# Patient Record
Sex: FEMALE | Race: WHITE | NOT HISPANIC OR LATINO
[De-identification: names, ages, dates, MRNs, and addresses within clinical notes are randomized per-mention and may not be internally consistent; named-entity substitution may affect disease eponyms.]

---

## 2022-05-24 PROBLEM — Z00.00 ENCOUNTER FOR PREVENTIVE HEALTH EXAMINATION: Status: ACTIVE | Noted: 2022-05-24

## 2022-06-24 ENCOUNTER — APPOINTMENT (OUTPATIENT)
Dept: COLORECTAL SURGERY | Facility: CLINIC | Age: 67
End: 2022-06-24
Payer: COMMERCIAL

## 2022-06-24 ENCOUNTER — NON-APPOINTMENT (OUTPATIENT)
Age: 67
End: 2022-06-24

## 2022-06-24 VITALS
WEIGHT: 142 LBS | DIASTOLIC BLOOD PRESSURE: 85 MMHG | HEART RATE: 80 BPM | SYSTOLIC BLOOD PRESSURE: 145 MMHG | TEMPERATURE: 98.7 F | BODY MASS INDEX: 21.52 KG/M2 | HEIGHT: 68 IN

## 2022-06-24 VITALS — DIASTOLIC BLOOD PRESSURE: 81 MMHG | TEMPERATURE: 73 F | SYSTOLIC BLOOD PRESSURE: 129 MMHG

## 2022-06-24 DIAGNOSIS — A63.0 ANOGENITAL (VENEREAL) WARTS: ICD-10-CM

## 2022-06-24 DIAGNOSIS — Z80.42 FAMILY HISTORY OF MALIGNANT NEOPLASM OF PROSTATE: ICD-10-CM

## 2022-06-24 DIAGNOSIS — Z82.49 FAMILY HISTORY OF ISCHEMIC HEART DISEASE AND OTHER DISEASES OF THE CIRCULATORY SYSTEM: ICD-10-CM

## 2022-06-24 DIAGNOSIS — Z83.79 FAMILY HISTORY OF OTHER DISEASES OF THE DIGESTIVE SYSTEM: ICD-10-CM

## 2022-06-24 DIAGNOSIS — K64.9 UNSPECIFIED HEMORRHOIDS: ICD-10-CM

## 2022-06-24 DIAGNOSIS — Z86.010 PERSONAL HISTORY OF COLONIC POLYPS: ICD-10-CM

## 2022-06-24 DIAGNOSIS — Z86.018 PERSONAL HISTORY OF OTHER BENIGN NEOPLASM: ICD-10-CM

## 2022-06-24 DIAGNOSIS — Z78.9 OTHER SPECIFIED HEALTH STATUS: ICD-10-CM

## 2022-06-24 DIAGNOSIS — Z83.438 FAMILY HISTORY OF OTHER DISORDER OF LIPOPROTEIN METABOLISM AND OTHER LIPIDEMIA: ICD-10-CM

## 2022-06-24 PROCEDURE — 99202 OFFICE O/P NEW SF 15 MIN: CPT | Mod: 25

## 2022-06-24 PROCEDURE — 46600 DIAGNOSTIC ANOSCOPY SPX: CPT

## 2022-06-24 RX ORDER — UBIDECARENONE/VIT E ACET 100MG-5
CAPSULE ORAL
Refills: 0 | Status: ACTIVE | COMMUNITY

## 2022-06-24 RX ORDER — MULTIVIT-MIN/IRON/FOLIC ACID/K 18-600-40
CAPSULE ORAL
Refills: 0 | Status: ACTIVE | COMMUNITY

## 2022-06-24 NOTE — PHYSICAL EXAM
[Excoriation] : no perianal excoriation [Skin Tags] : residual hemorrhoidal skin tags were noted [Normal] : was normal [None] : there was no rectal mass  [de-identified] : Anal pap performed [FreeTextEntry1] : Medical assistant was present for the entire exam.\par \par Anoscopy was performed for evaluation of the patients rectal bleeding  history .\par The risks, benefits and alternatives were reviewed.\par \par A lighted anoscope was passed into the anal canal and the entire anal mucosal surface was inspected..  \par The findings revealed mild internal hemorrhoids.\par No masses or lesions were identified.\par \par

## 2022-06-24 NOTE — HISTORY OF PRESENT ILLNESS
[FreeTextEntry1] : 65 yo F presents for evaluation of anal dysplasia, referred by Dr. VASYL Panchal\par \par PMH\par PSH hysterectomy 11/28/2007 2/2 fibroids, hernia repair 7/20/2004\par \par Previously under the care of Dr. 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 Zan Quezada which was negative per patient. \par h/o colon polyps (sessile serrated on prior scope 2015 as well as condyloma)\par Next scheduled 10/2022\par \par

## 2022-06-27 ENCOUNTER — LABORATORY RESULT (OUTPATIENT)
Age: 67
End: 2022-06-27

## 2022-06-28 PROBLEM — Z83.438 FAMILY HISTORY OF HYPERLIPIDEMIA: Status: ACTIVE | Noted: 2022-06-24

## 2022-06-28 PROBLEM — Z82.49 FAMILY HISTORY OF HYPERTENSION: Status: ACTIVE | Noted: 2022-06-24

## 2022-06-28 PROBLEM — A63.0 HPV (HUMAN PAPILLOMA VIRUS) ANOGENITAL INFECTION: Status: RESOLVED | Noted: 2022-06-22 | Resolved: 2022-06-28

## 2022-06-28 PROBLEM — Z80.42 FAMILY HISTORY OF MALIGNANT NEOPLASM OF PROSTATE: Status: ACTIVE | Noted: 2022-06-22

## 2022-06-28 PROBLEM — K64.9 HEMORRHOIDS: Status: ACTIVE | Noted: 2022-06-22

## 2022-06-28 PROBLEM — Z83.79 FAMILY HISTORY OF COLONIC DIVERTICULITIS: Status: ACTIVE | Noted: 2022-06-22

## 2022-06-28 PROBLEM — Z86.010 HISTORY OF COLONIC POLYPS: Status: RESOLVED | Noted: 2022-06-24 | Resolved: 2022-06-28

## 2022-06-28 PROBLEM — Z78.9 NON-SMOKER: Status: ACTIVE | Noted: 2022-06-24

## 2022-06-28 PROBLEM — Z86.018 HISTORY OF UTERINE LEIOMYOMA: Status: RESOLVED | Noted: 2022-06-22 | Resolved: 2022-06-28

## 2022-06-30 LAB — ANAL PAP CYTOLOGY: NORMAL

## 2022-07-06 ENCOUNTER — TRANSCRIPTION ENCOUNTER (OUTPATIENT)
Age: 67
End: 2022-07-06

## 2022-12-12 ENCOUNTER — APPOINTMENT (OUTPATIENT)
Dept: COLORECTAL SURGERY | Facility: CLINIC | Age: 67
End: 2022-12-12

## 2022-12-12 ENCOUNTER — LABORATORY RESULT (OUTPATIENT)
Age: 67
End: 2022-12-12

## 2022-12-12 VITALS
HEIGHT: 68 IN | SYSTOLIC BLOOD PRESSURE: 157 MMHG | TEMPERATURE: 97 F | WEIGHT: 140 LBS | HEART RATE: 73 BPM | BODY MASS INDEX: 21.22 KG/M2 | DIASTOLIC BLOOD PRESSURE: 84 MMHG

## 2022-12-12 PROCEDURE — 46600 DIAGNOSTIC ANOSCOPY SPX: CPT

## 2022-12-12 PROCEDURE — 99213 OFFICE O/P EST LOW 20 MIN: CPT | Mod: 25

## 2022-12-12 NOTE — PHYSICAL EXAM
[Excoriation] : no perianal excoriation [Skin Tags] : residual hemorrhoidal skin tags were noted [Normal] : was normal [None] : there was no rectal mass  [de-identified] : Anal pap performed [FreeTextEntry1] : Medical assistant was present for the entire exam.\par \par Anoscopy was performed for evaluation of the patients rectal bleeding  history .\par The risks, benefits and alternatives were reviewed.\par \par A lighted anoscope was passed into the anal canal and the entire anal mucosal surface was inspected..  \par The findings revealed mild internal hemorrhoids.\par No masses or lesions were identified.\par \par

## 2022-12-12 NOTE — HISTORY OF PRESENT ILLNESS
[FreeTextEntry1] : 68 y/o F presents for follow up evaluation of anal dysplasia \par Patient known to Dr. Panchal\par PSH hysterectomy 11/28/2007 2/2 fibroids, hernia repair 7/20/2004\par \par Previously under the care of Dr. 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 No perianal excoriation, external hemorrhoid, residual hemorrhoidal skin tags noted. Sphincter tone was normal. no rectal tenderness. Mild internal hemorrhoids appreciated on anoscopy. Routine evaluation, and anal pap surveillance advised. \par Anal pap (6/29/22) Atypical squamous cells of undetermined significance (ASC-US), HR HPV DNA present. 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 Denies pain, itching, lumps or bumps or bleeding\par No issues w/ BMs\par \par \par Received Gardasil Vaccine (2018)\par Last colonoscopy end Nov w/ Dr. Quezada, 1 benign polyp, recommend to repeat in 5-7 years\par Prior Colonoscopy 10/20/19,w/ Dr. Zan Quezada which was negative per patient. \par h/o colon polyps (sessile serrated on prior scope 2015 as well as condyloma)\par \par Took Motrin in the last week.

## 2022-12-21 ENCOUNTER — NON-APPOINTMENT (OUTPATIENT)
Age: 67
End: 2022-12-21

## 2022-12-21 LAB — ANAL PAP CYTOLOGY: NORMAL

## 2023-07-21 ENCOUNTER — APPOINTMENT (OUTPATIENT)
Dept: COLORECTAL SURGERY | Facility: CLINIC | Age: 68
End: 2023-07-21
Payer: MEDICARE

## 2023-07-21 ENCOUNTER — NON-APPOINTMENT (OUTPATIENT)
Age: 68
End: 2023-07-21

## 2023-07-21 VITALS
SYSTOLIC BLOOD PRESSURE: 139 MMHG | WEIGHT: 144 LBS | DIASTOLIC BLOOD PRESSURE: 85 MMHG | HEIGHT: 68 IN | HEART RATE: 70 BPM | BODY MASS INDEX: 21.82 KG/M2 | TEMPERATURE: 98.2 F

## 2023-07-21 PROCEDURE — 46600 DIAGNOSTIC ANOSCOPY SPX: CPT

## 2023-07-21 PROCEDURE — 99213 OFFICE O/P EST LOW 20 MIN: CPT | Mod: 25

## 2023-07-21 NOTE — PHYSICAL EXAM
[Excoriation] : no perianal excoriation [Skin Tags] : residual hemorrhoidal skin tags were noted [Normal] : was normal [None] : there was no rectal mass  [de-identified] : Anal pap performed [FreeTextEntry1] : Medical assistant was present for the entire exam.\par \par Anoscopy was performed for evaluation of the patients rectal bleeding  history .\par The risks, benefits and alternatives were reviewed.\par \par A lighted anoscope was passed into the anal canal and the entire anal mucosal surface was inspected..  \par The findings revealed mild internal hemorrhoids.\par No masses or lesions were identified.\par \par

## 2023-07-21 NOTE — HISTORY OF PRESENT ILLNESS
[FreeTextEntry1] : 66 y/o F presents in follow up of anal dysplasia \par Patient known to Dr. Panchal\par PSH hysterectomy 11/28/2007 2/2 fibroids, hernia repair 7/20/2004\par \par Received Gardasil Vaccine (2018)\par Last colonoscopy end Nov w/ Dr. Quezada, 1 benign polyp, recommend to repeat in 5-7 years\par Prior Colonoscopy 10/20/19,w/ Dr. Zan Quezada which was negative per patient. \par h/o colon polyps (sessile serrated on prior scope 2015 as well as condyloma)\par \par Previously under the care of Dr. 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 No perianal excoriation, external hemorrhoid, residual hemorrhoidal skin tags noted. Sphincter tone was normal. no rectal tenderness. Mild internal hemorrhoids appreciated on anoscopy. Routine evaluation, and anal pap surveillance advised. \par Anal pap (6/29/22) Atypical squamous cells of undetermined significance (ASC-US), HR HPV DNA present. 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 External hemorrhoid, residual hemorrhoidal skin tags noted. Sphincter tone was normal. Mild internal hemorrhoids. \par Anal pap: Atypical squamous cells of undetermined significance (ASC-US)\par \par Pt otherwise doing well. Reports change in stool consistency (looser) while in [place] while taking care of her parents, then also in while traveling in [facility] that caused some raw feeling. Last occurred a couple days ago. She did not apply any topical creams\par Denies pain or bleeding\par Stool consistency has normalized since returning back to [place]. Follows vegetarian diet and reports adequate fiber intake\par \par

## 2023-08-02 ENCOUNTER — NON-APPOINTMENT (OUTPATIENT)
Age: 68
End: 2023-08-02

## 2023-08-02 LAB — ANAL PAP CYTOLOGY: NORMAL

## 2023-11-17 ENCOUNTER — APPOINTMENT (OUTPATIENT)
Dept: COLORECTAL SURGERY | Facility: CLINIC | Age: 68
End: 2023-11-17
Payer: MEDICARE

## 2023-11-17 VITALS
DIASTOLIC BLOOD PRESSURE: 82 MMHG | WEIGHT: 138 LBS | HEIGHT: 68 IN | SYSTOLIC BLOOD PRESSURE: 139 MMHG | HEART RATE: 67 BPM | TEMPERATURE: 97.9 F | BODY MASS INDEX: 20.92 KG/M2

## 2023-11-17 PROCEDURE — 46600 DIAGNOSTIC ANOSCOPY SPX: CPT

## 2023-11-17 PROCEDURE — 99214 OFFICE O/P EST MOD 30 MIN: CPT | Mod: 25

## 2023-11-22 LAB — ANAL PAP CYTOLOGY: NORMAL

## 2024-01-17 ENCOUNTER — TRANSCRIPTION ENCOUNTER (OUTPATIENT)
Age: 69
End: 2024-01-17

## 2024-01-17 NOTE — ASU PATIENT PROFILE, ADULT - FALL HARM RISK - HARM RISK INTERVENTIONS

## 2024-01-17 NOTE — ASU PATIENT PROFILE, ADULT - NSICDXPASTSURGICALHX_GEN_ALL_CORE_FT
PAST SURGICAL HISTORY:  H/O inguinal hernia repair right    S/P colonoscopy X4    S/P hysterectomy 2006

## 2024-01-17 NOTE — ASU PATIENT PROFILE, ADULT - NSICDXPASTMEDICALHX_GEN_ALL_CORE_FT
PAST MEDICAL HISTORY:  Asthma     H/O chronic sinusitis     History of Clostridium difficile colitis     Hypertrophy, nasal, turbinate     MVP (mitral valve prolapse)      PAST MEDICAL HISTORY:  H/O migraine      PAST MEDICAL HISTORY:  H/O migraine     Hyperparathyroidism

## 2024-01-17 NOTE — ASU PATIENT PROFILE, ADULT - NS PREOP UNDERSTANDS INFO
As per MD nothing to eat or drink after midnight; patient reminded to come with photo ID/insurance/credit card; dress comfortable; no jewelries/contact/lens/valuables; no smoking/drinking alcohol/recreational drug use today; escort must have a photo ID; address and callback number given./yes

## 2024-01-18 ENCOUNTER — RESULT REVIEW (OUTPATIENT)
Age: 69
End: 2024-01-18

## 2024-01-18 ENCOUNTER — TRANSCRIPTION ENCOUNTER (OUTPATIENT)
Age: 69
End: 2024-01-18

## 2024-01-18 ENCOUNTER — OUTPATIENT (OUTPATIENT)
Dept: OUTPATIENT SERVICES | Facility: HOSPITAL | Age: 69
LOS: 1 days | Discharge: HOME | End: 2024-01-18
Payer: MEDICARE

## 2024-01-18 ENCOUNTER — APPOINTMENT (OUTPATIENT)
Dept: COLORECTAL SURGERY | Facility: HOSPITAL | Age: 69
End: 2024-01-18

## 2024-01-18 VITALS
DIASTOLIC BLOOD PRESSURE: 80 MMHG | HEIGHT: 67 IN | WEIGHT: 131.18 LBS | OXYGEN SATURATION: 99 % | SYSTOLIC BLOOD PRESSURE: 141 MMHG | TEMPERATURE: 98 F | HEART RATE: 77 BPM | RESPIRATION RATE: 14 BRPM

## 2024-01-18 VITALS
OXYGEN SATURATION: 99 % | SYSTOLIC BLOOD PRESSURE: 118 MMHG | HEART RATE: 78 BPM | TEMPERATURE: 98 F | DIASTOLIC BLOOD PRESSURE: 70 MMHG | RESPIRATION RATE: 16 BRPM

## 2024-01-18 DIAGNOSIS — Z90.710 ACQUIRED ABSENCE OF BOTH CERVIX AND UTERUS: Chronic | ICD-10-CM

## 2024-01-18 DIAGNOSIS — Z98.890 OTHER SPECIFIED POSTPROCEDURAL STATES: Chronic | ICD-10-CM

## 2024-01-18 PROBLEM — Z86.69 PERSONAL HISTORY OF OTHER DISEASES OF THE NERVOUS SYSTEM AND SENSE ORGANS: Chronic | Status: ACTIVE | Noted: 2024-01-17

## 2024-01-18 PROCEDURE — 46924 DESTRUCTION ANAL LESION(S): CPT | Mod: GC

## 2024-01-18 PROCEDURE — 88341 IMHCHEM/IMCYTCHM EA ADD ANTB: CPT | Mod: 26

## 2024-01-18 PROCEDURE — 88342 IMHCHEM/IMCYTCHM 1ST ANTB: CPT | Mod: 26

## 2024-01-18 PROCEDURE — 88305 TISSUE EXAM BY PATHOLOGIST: CPT | Mod: 26

## 2024-01-18 PROCEDURE — 46607 DIAGNOSTIC ANOSCOPY & BIOPSY: CPT | Mod: GC

## 2024-01-18 DEVICE — SURGCEL 4 X 8": Type: IMPLANTABLE DEVICE | Status: FUNCTIONAL

## 2024-01-18 RX ORDER — IODINE/POTASSIUM IODIDE 5 %-10 %
1 SOLUTION, NON-ORAL TOPICAL ONCE
Refills: 0 | Status: DISCONTINUED | OUTPATIENT
Start: 2024-01-18 | End: 2024-01-18

## 2024-01-18 RX ORDER — FENTANYL CITRATE 50 UG/ML
25 INJECTION INTRAVENOUS
Refills: 0 | Status: DISCONTINUED | OUTPATIENT
Start: 2024-01-18 | End: 2024-01-18

## 2024-01-18 RX ORDER — ONDANSETRON 8 MG/1
4 TABLET, FILM COATED ORAL ONCE
Refills: 0 | Status: DISCONTINUED | OUTPATIENT
Start: 2024-01-18 | End: 2024-01-18

## 2024-01-18 RX ORDER — ALBUTEROL 90 UG/1
2 AEROSOL, METERED ORAL
Refills: 0 | DISCHARGE

## 2024-01-18 RX ORDER — OXYCODONE HYDROCHLORIDE 5 MG/1
1 TABLET ORAL
Qty: 10 | Refills: 0
Start: 2024-01-18

## 2024-01-18 RX ORDER — FLUTICASONE PROPIONATE AND SALMETEROL 50; 250 UG/1; UG/1
1 POWDER ORAL; RESPIRATORY (INHALATION)
Refills: 0 | DISCHARGE

## 2024-01-18 RX ORDER — SUMATRIPTAN SUCCINATE 4 MG/.5ML
1 INJECTION, SOLUTION SUBCUTANEOUS
Refills: 0 | DISCHARGE

## 2024-01-18 RX ORDER — DOCUSATE SODIUM 100 MG
1 CAPSULE ORAL
Qty: 60 | Refills: 0
Start: 2024-01-18 | End: 2024-02-16

## 2024-01-18 RX ORDER — L.ACIDOPH/B.ANIMALIS/B.LONGUM 15B CELL
1 CAPSULE ORAL
Refills: 0 | DISCHARGE

## 2024-01-18 RX ORDER — SODIUM CHLORIDE 9 MG/ML
500 INJECTION, SOLUTION INTRAVENOUS
Refills: 0 | Status: DISCONTINUED | OUTPATIENT
Start: 2024-01-18 | End: 2024-01-18

## 2024-01-18 RX ORDER — ACETAMINOPHEN 500 MG
650 TABLET ORAL ONCE
Refills: 0 | Status: DISCONTINUED | OUTPATIENT
Start: 2024-01-18 | End: 2024-01-18

## 2024-01-18 RX ADMIN — SODIUM CHLORIDE 100 MILLILITER(S): 9 INJECTION, SOLUTION INTRAVENOUS at 08:30

## 2024-01-18 NOTE — ASU DISCHARGE PLAN (ADULT/PEDIATRIC) - NS MD DC FALL RISK RISK
For information on Fall & Injury Prevention, visit: [email or web address] OR  [email or web address] OR  https://www.cdc.gov/steadi/patient.html

## 2024-01-18 NOTE — BRIEF OPERATIVE NOTE - OPERATION/FINDINGS
Patient was positioned prone. Patient was positioned prone. Local anesthesia was given. EUA performed. Anoscopy used to visualized lesions with acetate acid and Lugol's. Biospies were taken from anterior and lateral anterior. Hemostasis was acheieved.

## 2024-01-18 NOTE — BRIEF OPERATIVE NOTE - NSICDXBRIEFPROCEDURE_GEN_ALL_CORE_FT
PROCEDURES:  High resolution anoscopy 18-Jan-2024 08:03:33  Peggy Castano  Biopsy, lesion, rectum, anal approach 18-Jan-2024 08:03:54  Peggy Castano

## 2024-01-18 NOTE — ASU DISCHARGE PLAN (ADULT/PEDIATRIC) - ASU DC SPECIAL INSTRUCTIONSFT
Paper instructions in the chart.     Follow up with Dr. Dobbs in one month, call office to make appointment.     Oxycodone: Please take as prescribed. Do not take more than 4 tablets a day. Take Colace twice a day. If you experience any shortness of breath or altered mental status while taking the medication, stop using the oxycodone, and go to the ED.  You are able to take Tylenol over the counter as directed on the bottle alongside and use the oxycodone for breakthrough pain.

## 2024-01-18 NOTE — ASU DISCHARGE PLAN (ADULT/PEDIATRIC) - CARE PROVIDER_API CALL
Hammad Dobbs  Surgery  [facility] [facility]  [place] [address]  Phone: [phone]  Fax: [phone]  Follow Up Time: 1 month

## 2024-01-18 NOTE — PRE-ANESTHESIA EVALUATION ADULT - NSANTHOBSERVEDRD_ENT_A_CORE
Reports spotting since this morning. Was seen yesterday and checked 3 cm and had membranes stripped. Reports fetal movement. Denies LOF. No

## 2024-01-24 LAB — SURGICAL PATHOLOGY STUDY: SIGNIFICANT CHANGE UP

## 2024-02-07 ENCOUNTER — NON-APPOINTMENT (OUTPATIENT)
Age: 69
End: 2024-02-07

## 2024-02-26 ENCOUNTER — APPOINTMENT (OUTPATIENT)
Dept: COLORECTAL SURGERY | Facility: CLINIC | Age: 69
End: 2024-02-26
Payer: MEDICARE

## 2024-02-26 VITALS
BODY MASS INDEX: 20 KG/M2 | TEMPERATURE: 97.8 F | HEIGHT: 68 IN | HEART RATE: 61 BPM | WEIGHT: 132 LBS | SYSTOLIC BLOOD PRESSURE: 104 MMHG | DIASTOLIC BLOOD PRESSURE: 69 MMHG

## 2024-02-26 PROBLEM — E21.3 HYPERPARATHYROIDISM, UNSPECIFIED: Chronic | Status: ACTIVE | Noted: 2024-01-18

## 2024-02-26 PROCEDURE — 99213 OFFICE O/P EST LOW 20 MIN: CPT | Mod: 25

## 2024-02-26 PROCEDURE — 46600 DIAGNOSTIC ANOSCOPY SPX: CPT

## 2024-02-26 NOTE — HISTORY OF PRESENT ILLNESS
[FreeTextEntry1] : 69 yo F w/ anal dysplasia presents for post op follow up, s/p HRA, biopsy and fulguration on 1/18/24  Surgical Pathology Report - Auth (Verified) Specimen(s) Submitted 1  Anterior anal canal 2  Left anterior anal canal  Final Diagnosis 1.  Anterior anal canal, biopsy: -   Low-grade squamous intraepithelial lesion (AIN 1).  Note: P16 and Ki-67 stains support the diagnosis.  Case was reviewed intradepartmentally (BP).  2.  Left anterior anal canal, biopsy: -   Squamous mucosa, negative for dysplasia.  Note: P16 and Ki-67 stains support the diagnosis. Verified by: Pao Cook MD  Pt reports flare of external hemorrhoids that were swollen that eventually reduced on their own after 2 weeks. Noted some bleeding during recovery, resolved after 2 weeks. Pt denies anorectal complaints, moving bowels regularly. Denies ASA/NSAID use

## 2024-02-26 NOTE — ASSESSMENT
[FreeTextEntry1] : Low-grade squamous intraepithelial lesion on recent HRA.  Currently recovering well.  Recommend return follow-up 4 months for repeat evaluation including cytology testing and exam.  Risk of recurrent dysplasia outlined.  All questions answered.

## 2024-02-26 NOTE — DATA REVIEWED
[FreeTextEntry1] : Prior history: Pt previously under the care of Dr. Neisha Begum. Per note, h/o condyloma on 2015 colonoscopy, s/p HRA 3/4/16 S/p office-based fulguration of condyloma 6/21/2016 6/2/2017 - Anal pap: ASCUS, HR HPV detected. 4/10/2018- Anal Pap, ASCUS can't r/o HSIL 4/18/18 - 1 cm lesion along dentate line at RA biopsied S/p office based fulguration of RA lesion in anal canal 5/2018 Completed Gardasil vaccine last colonoscopy ?11/2022 Patient s/p HRA on 5/20/22, pathology c/w AIN III at left lateral and left posterior. Recommended to repeat HRA in 3 months  Due to insurance issues, patient presented to establish care 6/24/22 Anal pap (6/29/22) ASCUS, HR HPV DNA present. 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

## 2024-02-26 NOTE — PHYSICAL EXAM
[Excoriation] : no perianal excoriation [Skin Tags] : residual hemorrhoidal skin tags were noted [Normal] : was normal [None] : there was no rectal mass  [FreeTextEntry1] : Medical assistant was present for the entire exam.\par  \par  Anoscopy was performed for evaluation of the patients rectal bleeding  history .\par  The risks, benefits and alternatives were reviewed.\par  \par  A lighted anoscope was passed into the anal canal and the entire anal mucosal surface was inspected..  \par  The findings revealed mild internal hemorrhoids.\par  No masses or lesions were identified.\par  \par

## 2024-06-28 ENCOUNTER — APPOINTMENT (OUTPATIENT)
Dept: COLORECTAL SURGERY | Facility: CLINIC | Age: 69
End: 2024-06-28
Payer: MEDICARE

## 2024-06-28 VITALS
HEIGHT: 68 IN | SYSTOLIC BLOOD PRESSURE: 109 MMHG | DIASTOLIC BLOOD PRESSURE: 72 MMHG | WEIGHT: 130 LBS | TEMPERATURE: 97.6 F | BODY MASS INDEX: 19.7 KG/M2 | HEART RATE: 67 BPM

## 2024-06-28 DIAGNOSIS — K62.82 DYSPLASIA OF ANUS: ICD-10-CM

## 2024-06-28 PROCEDURE — 46600 DIAGNOSTIC ANOSCOPY SPX: CPT

## 2024-06-28 PROCEDURE — 99213 OFFICE O/P EST LOW 20 MIN: CPT | Mod: 25

## 2024-07-10 ENCOUNTER — NON-APPOINTMENT (OUTPATIENT)
Age: 69
End: 2024-07-10

## 2024-09-23 ENCOUNTER — APPOINTMENT (OUTPATIENT)
Dept: COLORECTAL SURGERY | Facility: CLINIC | Age: 69
End: 2024-09-23
Payer: MEDICARE

## 2024-09-23 ENCOUNTER — LABORATORY RESULT (OUTPATIENT)
Age: 69
End: 2024-09-23

## 2024-09-23 VITALS
HEART RATE: 72 BPM | WEIGHT: 132.28 LBS | BODY MASS INDEX: 20.05 KG/M2 | HEIGHT: 68 IN | TEMPERATURE: 97.6 F | SYSTOLIC BLOOD PRESSURE: 131 MMHG | DIASTOLIC BLOOD PRESSURE: 82 MMHG

## 2024-09-23 DIAGNOSIS — K62.82 DYSPLASIA OF ANUS: ICD-10-CM

## 2024-09-23 PROCEDURE — 99213 OFFICE O/P EST LOW 20 MIN: CPT | Mod: 25

## 2024-09-23 PROCEDURE — 46600 DIAGNOSTIC ANOSCOPY SPX: CPT

## 2024-09-23 NOTE — ASSESSMENT
[FreeTextEntry1] : I have reviewed with the patient the clinical and natural history of human papilloma virus and its relationship to the anus. The risks and associated consequences including sexual transmission, anal warts, anal dysplasia, and the risk of anal cancer have been outlined. The need for long-term surveillance and followup has been detailed. The treatment options including high resolution anoscopy and its associated risk of recurrence and post procedure stricture and pain compared to continued close surveillance and monitoring were reviewed. The patient wishes to proceed with surveillance and management of identified visible/palpable lesions as identified or high grade ( HGSIL) dysplasia identified on cytology.  Patient is planning potential relocation to [facility].  She will be in contact regarding her scheduling..  Recommend continued surveillance and follow-up of her high-grade anal dysplasia and risk of malignancy outlined.

## 2024-09-23 NOTE — HISTORY OF PRESENT ILLNESS
[FreeTextEntry1] : 69 yo F presents for anal dysplasia follow up.  PSH: Hysterectomy 11/28/2007, secondary to fibroid, hernia repair 7/20/2004  Received Gardasil Vaccine (2018) Last colonoscopy end Nov w/ Dr. Quezada, 1 benign polyp, recommend to repeat in 5-7 years Prior Colonoscopy 10/20/19, w/ Dr. Zan Quezada which was negative per patient.  h/o colon polyps (sessile serrated on prior scope 2015 as well as condyloma)  Pt previously under the care of Dr. Neisha Begum. Per note, h/o condyloma on 2015 colonoscopy, s/p HRA 3/4/16  S/p office-based fulguration of condyloma 6/21/2016 6/2/2017 - Anal pap: ASCUS, HR HPV detected.  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 Discussed results with pt and recommended pt repeat in 3 months, if persistent recommend HRA.  11/17/2023: High grade squamous intraepithelial lesion (HSIL)  S/p HRA biopsy and fulguration on 1/18/24, path c/w LSIL (AIN 1)  Seen 2/26/24 for f/u, exam noted external hemorrhoid, residual hemorrhoid skin tags noted. Mild internal hemorrhoids. Patient with LSIL on recent HRA, recommended f/u in 4 months for repeat evaluation and cytology testing.  Last seen 06/28/2024 for f/u and repeat pap. Exam noted mild internal hemorrhoids.  HRA showed ASCUS, cannot exclusive high grade squamous.   Pt here today for repeat pap. No anorectal complaints today. Denies ASA/ NSAIDS in past 7 days.

## 2024-09-23 NOTE — PHYSICAL EXAM
[Excoriation] : no perianal excoriation [Skin Tags] : residual hemorrhoidal skin tags were noted [Normal] : was normal [None] : there was no rectal mass  [de-identified] : Anal pap performed [FreeTextEntry1] : Medical assistant was present for the entire exam.\par  \par  Anoscopy was performed for evaluation of the patients rectal bleeding  history .\par  The risks, benefits and alternatives were reviewed.\par  \par  A lighted anoscope was passed into the anal canal and the entire anal mucosal surface was inspected..  \par  The findings revealed mild internal hemorrhoids.\par  No masses or lesions were identified.\par  \par

## 2024-09-23 NOTE — ASSESSMENT
[FreeTextEntry1] : I have reviewed with the patient the clinical and natural history of human papilloma virus and its relationship to the anus. The risks and associated consequences including sexual transmission, anal warts, anal dysplasia, and the risk of anal cancer have been outlined. The need for long-term surveillance and followup has been detailed. The treatment options including high resolution anoscopy and its associated risk of recurrence and post procedure stricture and pain compared to continued close surveillance and monitoring were reviewed. The patient wishes to proceed with surveillance and management of identified visible/palpable lesions as identified or high grade ( HGSIL) dysplasia identified on cytology.  Patient is planning potential relocation to [facility].  She will be in contact regarding her scheduling..  Recommend continued surveillance and follow-up of her high-grade anal dysplasia and risk of malignancy outlined.

## 2024-09-23 NOTE — PHYSICAL EXAM
[Excoriation] : no perianal excoriation [Skin Tags] : residual hemorrhoidal skin tags were noted [Normal] : was normal [None] : there was no rectal mass  [de-identified] : Anal pap performed [FreeTextEntry1] : Medical assistant was present for the entire exam.\par  \par  Anoscopy was performed for evaluation of the patients rectal bleeding  history .\par  The risks, benefits and alternatives were reviewed.\par  \par  A lighted anoscope was passed into the anal canal and the entire anal mucosal surface was inspected..  \par  The findings revealed mild internal hemorrhoids.\par  No masses or lesions were identified.\par  \par

## 2024-10-04 ENCOUNTER — NON-APPOINTMENT (OUTPATIENT)
Age: 69
End: 2024-10-04

## 2025-06-09 ENCOUNTER — NON-APPOINTMENT (OUTPATIENT)
Age: 70
End: 2025-06-09

## 2025-06-09 ENCOUNTER — LABORATORY RESULT (OUTPATIENT)
Age: 70
End: 2025-06-09

## 2025-06-09 ENCOUNTER — APPOINTMENT (OUTPATIENT)
Dept: COLORECTAL SURGERY | Facility: CLINIC | Age: 70
End: 2025-06-09
Payer: MEDICARE

## 2025-06-09 VITALS
DIASTOLIC BLOOD PRESSURE: 81 MMHG | TEMPERATURE: 97.1 F | BODY MASS INDEX: 20.4 KG/M2 | WEIGHT: 130 LBS | SYSTOLIC BLOOD PRESSURE: 131 MMHG | HEIGHT: 67 IN | HEART RATE: 66 BPM

## 2025-06-09 PROCEDURE — 99213 OFFICE O/P EST LOW 20 MIN: CPT | Mod: 25

## 2025-06-09 PROCEDURE — 46600 DIAGNOSTIC ANOSCOPY SPX: CPT

## 2025-06-18 ENCOUNTER — NON-APPOINTMENT (OUTPATIENT)
Age: 70
End: 2025-06-18

## 2025-06-18 LAB — ANAL PAP CYTOLOGY: NORMAL

## (undated) DEVICE — POSITIONER STRAP KNEE & BODY 3X60" DISP

## (undated) DEVICE — DRSG CURITY GAUZE SPONGE 4 X 8" 12-PLY NON-STERILE

## (undated) DEVICE — VENODYNE/SCD SLEEVE CALF MEDIUM

## (undated) DEVICE — TAPE SILK 3"

## (undated) DEVICE — SPECIMEN CONTAINER 4OZ

## (undated) DEVICE — GLV 7.5 PROTEXIS (WHITE)

## (undated) DEVICE — DRSG TELFA 3 X 8

## (undated) DEVICE — POSITIONER FOAM EGG CRATE ULNAR 2PCS (PINK)

## (undated) DEVICE — ELCTR STRYKER NEPTUNE BLADE COATED, INSULATED 70MM

## (undated) DEVICE — LUBRICATING JELLY ONESHOT 1.25OZ

## (undated) DEVICE — NDL HYPO SAFE 22G X 1.5" (BLACK)

## (undated) DEVICE — SYR ASEPTO

## (undated) DEVICE — ELCTR PENCIL SMOKE EVACUATOR COATED PUSH BUTTON 70MM

## (undated) DEVICE — GLV 8 PROTEXIS (WHITE)

## (undated) DEVICE — GOWN ROYAL SILK XL

## (undated) DEVICE — WARMING BLANKET UPPER ADULT

## (undated) DEVICE — PACK COLO RECTAL